# Patient Record
Sex: FEMALE | Race: WHITE | NOT HISPANIC OR LATINO | URBAN - METROPOLITAN AREA
[De-identification: names, ages, dates, MRNs, and addresses within clinical notes are randomized per-mention and may not be internally consistent; named-entity substitution may affect disease eponyms.]

---

## 2024-01-24 ENCOUNTER — OFFICE VISIT (OUTPATIENT)
Dept: INTERNAL MEDICINE | Facility: CLINIC | Age: 24
End: 2024-01-24
Payer: COMMERCIAL

## 2024-01-24 ENCOUNTER — LAB (OUTPATIENT)
Dept: INTERNAL MEDICINE | Facility: CLINIC | Age: 24
End: 2024-01-24
Payer: COMMERCIAL

## 2024-01-24 VITALS
SYSTOLIC BLOOD PRESSURE: 100 MMHG | HEART RATE: 72 BPM | TEMPERATURE: 97.1 F | RESPIRATION RATE: 16 BRPM | OXYGEN SATURATION: 98 % | HEIGHT: 61 IN | WEIGHT: 104 LBS | DIASTOLIC BLOOD PRESSURE: 72 MMHG | BODY MASS INDEX: 19.63 KG/M2

## 2024-01-24 DIAGNOSIS — Z11.4 SCREENING FOR HIV (HUMAN IMMUNODEFICIENCY VIRUS): ICD-10-CM

## 2024-01-24 DIAGNOSIS — Z00.00 PHYSICAL EXAM: Primary | ICD-10-CM

## 2024-01-24 DIAGNOSIS — E55.9 VITAMIN D DEFICIENCY: ICD-10-CM

## 2024-01-24 DIAGNOSIS — Z11.59 NEED FOR HEPATITIS C SCREENING TEST: ICD-10-CM

## 2024-01-24 DIAGNOSIS — Z01.84 IMMUNITY STATUS TESTING: Primary | ICD-10-CM

## 2024-01-24 DIAGNOSIS — Z00.00 ANNUAL PHYSICAL EXAM: ICD-10-CM

## 2024-01-24 DIAGNOSIS — Z11.51 SCREENING FOR HPV (HUMAN PAPILLOMAVIRUS): ICD-10-CM

## 2024-01-24 DIAGNOSIS — Z23 NEED FOR TETANUS BOOSTER: ICD-10-CM

## 2024-01-24 DIAGNOSIS — K52.9 CHRONIC DIARRHEA: ICD-10-CM

## 2024-01-24 DIAGNOSIS — J03.90 TONSILLITIS: ICD-10-CM

## 2024-01-24 LAB
BASOPHILS # BLD AUTO: 0.05 10*3/MM3 (ref 0–0.2)
BASOPHILS NFR BLD AUTO: 0.6 % (ref 0–1.5)
DEPRECATED RDW RBC AUTO: 40.4 FL (ref 37–54)
EOSINOPHIL # BLD AUTO: 0.07 10*3/MM3 (ref 0–0.4)
EOSINOPHIL NFR BLD AUTO: 0.9 % (ref 0.3–6.2)
ERYTHROCYTE [DISTWIDTH] IN BLOOD BY AUTOMATED COUNT: 12.4 % (ref 12.3–15.4)
ERYTHROCYTE [SEDIMENTATION RATE] IN BLOOD: 2 MM/HR (ref 0–20)
HCT VFR BLD AUTO: 39.6 % (ref 34–46.6)
HGB BLD-MCNC: 13.1 G/DL (ref 12–15.9)
IMM GRANULOCYTES # BLD AUTO: 0.01 10*3/MM3 (ref 0–0.05)
IMM GRANULOCYTES NFR BLD AUTO: 0.1 % (ref 0–0.5)
LYMPHOCYTES # BLD AUTO: 2.71 10*3/MM3 (ref 0.7–3.1)
LYMPHOCYTES NFR BLD AUTO: 33.7 % (ref 19.6–45.3)
MCH RBC QN AUTO: 29.6 PG (ref 26.6–33)
MCHC RBC AUTO-ENTMCNC: 33.1 G/DL (ref 31.5–35.7)
MCV RBC AUTO: 89.4 FL (ref 79–97)
MONOCYTES # BLD AUTO: 0.72 10*3/MM3 (ref 0.1–0.9)
MONOCYTES NFR BLD AUTO: 9 % (ref 5–12)
NEUTROPHILS NFR BLD AUTO: 4.47 10*3/MM3 (ref 1.7–7)
NEUTROPHILS NFR BLD AUTO: 55.7 % (ref 42.7–76)
NRBC BLD AUTO-RTO: 0 /100 WBC (ref 0–0.2)
PLATELET # BLD AUTO: 294 10*3/MM3 (ref 140–450)
PMV BLD AUTO: 10.8 FL (ref 6–12)
RBC # BLD AUTO: 4.43 10*6/MM3 (ref 3.77–5.28)
WBC NRBC COR # BLD AUTO: 8.03 10*3/MM3 (ref 3.4–10.8)

## 2024-01-24 PROCEDURE — 86317 IMMUNOASSAY INFECTIOUS AGENT: CPT | Performed by: INTERNAL MEDICINE

## 2024-01-24 PROCEDURE — 86735 MUMPS ANTIBODY: CPT | Performed by: INTERNAL MEDICINE

## 2024-01-24 PROCEDURE — 86671 FUNGUS NES ANTIBODY: CPT | Performed by: INTERNAL MEDICINE

## 2024-01-24 PROCEDURE — 81001 URINALYSIS AUTO W/SCOPE: CPT | Performed by: INTERNAL MEDICINE

## 2024-01-24 PROCEDURE — G0432 EIA HIV-1/HIV-2 SCREEN: HCPCS | Performed by: INTERNAL MEDICINE

## 2024-01-24 PROCEDURE — 82607 VITAMIN B-12: CPT | Performed by: INTERNAL MEDICINE

## 2024-01-24 PROCEDURE — 82306 VITAMIN D 25 HYDROXY: CPT | Performed by: INTERNAL MEDICINE

## 2024-01-24 PROCEDURE — 80053 COMPREHEN METABOLIC PANEL: CPT | Performed by: INTERNAL MEDICINE

## 2024-01-24 PROCEDURE — 85652 RBC SED RATE AUTOMATED: CPT | Performed by: INTERNAL MEDICINE

## 2024-01-24 PROCEDURE — 85025 COMPLETE CBC W/AUTO DIFF WBC: CPT | Performed by: INTERNAL MEDICINE

## 2024-01-24 PROCEDURE — 86803 HEPATITIS C AB TEST: CPT | Performed by: INTERNAL MEDICINE

## 2024-01-24 PROCEDURE — 86037 ANCA TITER EACH ANTIBODY: CPT | Performed by: INTERNAL MEDICINE

## 2024-01-24 PROCEDURE — 86658 ENTEROVIRUS ANTIBODY: CPT | Performed by: INTERNAL MEDICINE

## 2024-01-24 PROCEDURE — 86762 RUBELLA ANTIBODY: CPT | Performed by: INTERNAL MEDICINE

## 2024-01-24 PROCEDURE — 86615 BORDETELLA ANTIBODY: CPT | Performed by: INTERNAL MEDICINE

## 2024-01-24 PROCEDURE — 86140 C-REACTIVE PROTEIN: CPT | Performed by: INTERNAL MEDICINE

## 2024-01-24 PROCEDURE — 83036 HEMOGLOBIN GLYCOSYLATED A1C: CPT | Performed by: INTERNAL MEDICINE

## 2024-01-24 PROCEDURE — 84443 ASSAY THYROID STIM HORMONE: CPT | Performed by: INTERNAL MEDICINE

## 2024-01-24 PROCEDURE — 86765 RUBEOLA ANTIBODY: CPT | Performed by: INTERNAL MEDICINE

## 2024-01-24 PROCEDURE — 80061 LIPID PANEL: CPT | Performed by: INTERNAL MEDICINE

## 2024-01-24 RX ORDER — LOXAPINE SUCCINATE 10 MG/1
TABLET ORAL
COMMUNITY

## 2024-01-24 RX ORDER — LOPERAMIDE HYDROCHLORIDE 2 MG/1
2 TABLET ORAL 4 TIMES DAILY PRN
Qty: 60 TABLET | Refills: 0 | Status: SHIPPED | OUTPATIENT
Start: 2024-01-24

## 2024-01-24 RX ORDER — AMOXICILLIN 875 MG/1
875 TABLET, COATED ORAL EVERY 12 HOURS SCHEDULED
Qty: 10 TABLET | Refills: 0 | Status: SHIPPED | OUTPATIENT
Start: 2024-01-24 | End: 2024-01-29

## 2024-01-24 NOTE — PROGRESS NOTES
Female Physical Note      Date: 2024   Patient Name: Nano Hernandez  : 2000   MRN: [id number]     Chief Complaint:    Chief Complaint   Patient presents with    Diarrhea     Going on for 2 yrs    Establish Care     Needs titers for vaccines     Annual Exam       History of Present Illness: Nano Hernandez is a 23 y.o. female who is here today for their annual health maintenance and physical. She is having chronic issues with diarrhea for the last 2 years.  She has never been evaluated for this.  Patient is needing titers for her vaccines.  Lost 40lbs from 6232-7097. Havent been able to gain back.  Little hair loss.  No cold or heat intolerance.      Has had night sweats. Has to not wear clothes due to the sweating.     Family hx of colon ca. Great grandmother. Great aunt and a cousin.      Tonsils are sore and inflammed last several days    Subjective      Review of Systems     I have reviewed the following portions of the patient's history and these were updated and discussed with the patient as appropriate: past family history, past medical history, past social history, past surgical history, and problem list.     Medications:     Current Outpatient Medications:     loxapine (LOXITANE) 10 MG capsule, , Disp: , Rfl:     loperamide (Imodium A-D) 2 MG tablet, Take 1 tablet by mouth 4 (Four) Times a Day As Needed for Diarrhea., Disp: 60 tablet, Rfl: 0    Allergies:   No Known Allergies    Immunizations:  Immunization History   Administered Date(s) Administered    Hep A, 2 Dose 2018    Meningococcal MCV4P (Menactra) 2018    Trumenba(meningococcal B) 2018      Colorectal Screening:     Last Completed Colonoscopy       This patient has no relevant Health Maintenance data.         None prior  Pap:    Last Completed Pap Smear       This patient has no relevant Health Maintenance data.         IUD 5 years   Mammogram:    Last Completed Mammogram       This patient has no relevant Health  "Maintenance data.             CT for Smoker (Age 55-75, 30pk yr):    Bone Density/DEXA:   Hep C ( 0744-6588): get today  HIV: get today    Diet/Physical activity: avg/no regular routine    Sexual Health: not currently active  Last menstrual period: 2 weeks ago    Breast feeding:           PHQ-9 Depression Screening  PHQ-9 Total Score: 14            Intimate partner violence: (Screen on initial visit, pregnant women, women with injuries, older adult with injury or evidence of neglect):  Violence can be a problem in many people's lives, so I now ask every patient about trauma or abuse they may have experienced in a relationship.  Stress/Safety - Do you feel safe in your relationship? Yes  Afraid/Abused - Have you ever been in a relationship where you were threatened, hurt, or afraid? No  Friend/Family - If yes, are your friends aware you have been hurt?  Emergency Plan - Do you have a safe place to go and the resources you need in an emergency? Yes    Osteoporosis: NA      Objective     Physical Exam:  Vital Signs:   Vitals:    24 1314   BP: 100/72   BP Location: Left arm   Patient Position: Sitting   Cuff Size: Adult   Pulse: 72   Resp: 16   Temp: 97.1 °F (36.2 °C)   TempSrc: Tympanic   SpO2: 98%   Weight: 47.2 kg (104 lb)   Height: 154.9 cm (61\")       Physical Exam  Constitutional:       General: She is not in acute distress.     Appearance: Normal appearance. She is not ill-appearing.   HENT:      Right Ear: Tympanic membrane normal.      Left Ear: Tympanic membrane normal.      Nose: No congestion or rhinorrhea.      Mouth/Throat:      Mouth: Mucous membranes are moist.      Pharynx: No oropharyngeal exudate.   Eyes:      Extraocular Movements: Extraocular movements intact.      Conjunctiva/sclera: Conjunctivae normal.      Pupils: Pupils are equal, round, and reactive to light.   Cardiovascular:      Rate and Rhythm: Normal rate and regular rhythm.   Pulmonary:      Effort: Pulmonary effort is normal. " No respiratory distress.      Breath sounds: Normal breath sounds.   Abdominal:      General: Abdomen is flat. Bowel sounds are normal.      Palpations: Abdomen is soft.      Tenderness: There is no abdominal tenderness.   Musculoskeletal:         General: No swelling.      Cervical back: Neck supple.      Right lower leg: No edema.      Left lower leg: No edema.   Skin:     Coloration: Skin is not jaundiced.      Findings: No rash.   Neurological:      General: No focal deficit present.      Mental Status: She is alert and oriented to person, place, and time.      Cranial Nerves: No cranial nerve deficit.   Psychiatric:         Mood and Affect: Mood normal.         Behavior: Behavior normal.         Thought Content: Thought content normal.         Procedures    Assessment / Plan      Assessment/Plan:   Diagnoses and all orders for this visit:    1. Immunity status testing (Primary)  -     Hepatitis B Surf Antibody Quant  -     Diphtheria Antitoxoid Ab  -     B Pertussis IgG Ab  -     Tetanus Toxoid, IgG  -     Measles / Mumps / Rubella Immunity  -     Poliovirus (Types 1,3) Ab    2. Chronic diarrhea  -     C-reactive protein  -     Sedimentation Rate  -     Inflammatory Bowel Disease Panel  -     Ambulatory Referral to Gastroenterology  -     loperamide (Imodium A-D) 2 MG tablet; Take 1 tablet by mouth 4 (Four) Times a Day As Needed for Diarrhea.  Dispense: 60 tablet; Refill: 0    3. Screening for prostate cancer    4. Annual physical exam  -     Hemoglobin A1c  -     Vitamin B12  -     Lipid Panel  -     Comprehensive Metabolic Panel  -     CBC & Differential  -     Urinalysis With Microscopic - Urine, Clean Catch  -     TSH Rfx On Abnormal To Free T4    5. Vitamin D deficiency  -     Vitamin D,25-Hydroxy    6. Need for hepatitis C screening test  -     Hepatitis C Antibody    7. Screening for HIV (human immunodeficiency virus)  -     HIV-1 / O / 2 Ag / Antibody    8. Screening for HPV (human papillomavirus)  -      Ambulatory Referral to Gynecology    9. Need for tetanus booster  -     Tdap Vaccine => 8yo IM (BOOSTRIX)         BMI is within normal parameters. No other follow-up for BMI required.       Follow Up:   No follow-ups on file.    Healthcare Maintenance:   Counseling provided on exercise, nutrition, age-appropriate screening test, and appropriate lab studies.   Nanocary Hernandez voices understanding and acceptance of this advice and will call back with any further questions or concerns. AVS with preventive healthcare tips printed for patient.     Reviewed the following with the patient: encouraged patient to exercise 5-7 days per week for 30 minutes at a time.          Terry Vee DO  [facility]

## 2024-01-25 PROBLEM — E55.9 VITAMIN D DEFICIENCY: Status: ACTIVE | Noted: 2024-01-25

## 2024-01-25 LAB
25(OH)D3 SERPL-MCNC: 9 NG/ML (ref 30–100)
ALBUMIN SERPL-MCNC: 4.9 G/DL (ref 3.5–5.2)
ALBUMIN/GLOB SERPL: 2.3 G/DL
ALP SERPL-CCNC: 64 U/L (ref 39–117)
ALT SERPL W P-5'-P-CCNC: 8 U/L (ref 1–33)
ANION GAP SERPL CALCULATED.3IONS-SCNC: 10.2 MMOL/L (ref 5–15)
AST SERPL-CCNC: 18 U/L (ref 1–32)
BACTERIA UR QL AUTO: ABNORMAL /HPF
BILIRUB SERPL-MCNC: 0.4 MG/DL (ref 0–1.2)
BILIRUB UR QL STRIP: NEGATIVE
BUN SERPL-MCNC: 13 MG/DL (ref 6–20)
BUN/CREAT SERPL: 18.1 (ref 7–25)
CALCIUM SPEC-SCNC: 9.8 MG/DL (ref 8.6–10.5)
CHLORIDE SERPL-SCNC: 104 MMOL/L (ref 98–107)
CHOLEST SERPL-MCNC: 141 MG/DL (ref 0–200)
CLARITY UR: CLEAR
CO2 SERPL-SCNC: 26.8 MMOL/L (ref 22–29)
COLOR UR: YELLOW
CREAT SERPL-MCNC: 0.72 MG/DL (ref 0.57–1)
CRP SERPL-MCNC: <0.3 MG/DL (ref 0–0.5)
EGFRCR SERPLBLD CKD-EPI 2021: 122.2 ML/MIN/1.73
GLOBULIN UR ELPH-MCNC: 2.1 GM/DL
GLUCOSE SERPL-MCNC: 82 MG/DL (ref 65–99)
GLUCOSE UR STRIP-MCNC: NEGATIVE MG/DL
HBA1C MFR BLD: 5.5 % (ref 4.8–5.6)
HCV AB SER DONR QL: NORMAL
HDLC SERPL-MCNC: 66 MG/DL (ref 40–60)
HGB UR QL STRIP.AUTO: NEGATIVE
HIV 1+2 AB+HIV1 P24 AG SERPL QL IA: NORMAL
HYALINE CASTS UR QL AUTO: ABNORMAL /LPF
KETONES UR QL STRIP: NEGATIVE
LDLC SERPL CALC-MCNC: 66 MG/DL (ref 0–100)
LDLC/HDLC SERPL: 1.03 {RATIO}
LEUKOCYTE ESTERASE UR QL STRIP.AUTO: NEGATIVE
MUCOUS THREADS URNS QL MICRO: ABNORMAL /HPF
NITRITE UR QL STRIP: NEGATIVE
PH UR STRIP.AUTO: 7.5 [PH] (ref 5–8)
POTASSIUM SERPL-SCNC: 4.1 MMOL/L (ref 3.5–5.2)
PROT SERPL-MCNC: 7 G/DL (ref 6–8.5)
PROT UR QL STRIP: ABNORMAL
RBC # UR STRIP: ABNORMAL /HPF
REF LAB TEST METHOD: ABNORMAL
SODIUM SERPL-SCNC: 141 MMOL/L (ref 136–145)
SP GR UR STRIP: 1.03 (ref 1–1.03)
SQUAMOUS #/AREA URNS HPF: ABNORMAL /HPF
TRIGL SERPL-MCNC: 36 MG/DL (ref 0–150)
TSH SERPL DL<=0.05 MIU/L-ACNC: 0.39 UIU/ML (ref 0.27–4.2)
UROBILINOGEN UR QL STRIP: ABNORMAL
VIT B12 BLD-MCNC: 353 PG/ML (ref 211–946)
VLDLC SERPL-MCNC: 9 MG/DL (ref 5–40)
WBC # UR STRIP: ABNORMAL /HPF

## 2024-01-25 RX ORDER — CHOLECALCIFEROL (VITAMIN D3) 50 MCG
2000 TABLET ORAL DAILY
Qty: 30 TABLET | Refills: 5 | Status: SHIPPED | OUTPATIENT
Start: 2024-01-25

## 2024-01-26 ENCOUNTER — PATIENT ROUNDING (BHMG ONLY) (OUTPATIENT)
Dept: INTERNAL MEDICINE | Facility: CLINIC | Age: 24
End: 2024-01-26
Payer: COMMERCIAL

## 2024-01-26 LAB
HBV SURFACE AB SER-ACNC: <3.1 MIU/ML
MEV IGG SER IA-ACNC: 153 AU/ML
MUV IGG SER IA-ACNC: 52.7 AU/ML
RUBV IGG SERPL IA-ACNC: 3.28 INDEX

## 2024-01-26 NOTE — PROGRESS NOTES
A  my chart message has been sent to the patient for patient rounding with [facility].

## 2024-01-29 LAB
B PERT IGG SER IA-ACNC: 1.2 INDEX (ref 0–0.94)
BAKER'S YEAST IGA QN: <20 UNITS (ref 0–24.9)
BAKER'S YEAST IGG QN: <20 UNITS (ref 0–24.9)
P-ANCA ATYPICAL TITR SER IF: NORMAL TITER

## 2024-01-30 ENCOUNTER — TELEPHONE (OUTPATIENT)
Dept: INTERNAL MEDICINE | Facility: CLINIC | Age: 24
End: 2024-01-30
Payer: COMMERCIAL

## 2024-01-30 LAB
C DIPHTHERIAE AB SER IA-ACNC: 0.61 IU/ML
C TETANI TOXOID IGG SERPL IA-ACNC: 0.7 IU/ML

## 2024-01-30 NOTE — TELEPHONE ENCOUNTER
LVM for pt to return call.     OK FOR HUB TO RELAY MESSAGE----- Message from Rajesh Chester PA-C sent at 1/30/2024 11:09 AM EST -----  Pt immune to whooping cough.

## 2024-01-30 NOTE — TELEPHONE ENCOUNTER
Name: Amelia Hernandezgail    Relationship: Self    Best Callback Number: [phone]    HUB PROVIDED THE RELAY MESSAGE FROM THE OFFICE   PATIENT VOICED UNDERSTANDING AND HAS NO FURTHER QUESTIONS AT THIS TIME

## 2024-02-05 LAB
PV1 NAB TITR SER NT: NORMAL {TITER}
PV3 NAB TITR SER NT: NORMAL {TITER}

## 2024-07-29 ENCOUNTER — OFFICE VISIT (OUTPATIENT)
Dept: INTERNAL MEDICINE | Facility: CLINIC | Age: 24
End: 2024-07-29
Payer: COMMERCIAL

## 2024-07-29 VITALS
SYSTOLIC BLOOD PRESSURE: 112 MMHG | WEIGHT: 105 LBS | TEMPERATURE: 97.3 F | DIASTOLIC BLOOD PRESSURE: 70 MMHG | HEART RATE: 76 BPM | HEIGHT: 61 IN | OXYGEN SATURATION: 98 % | BODY MASS INDEX: 19.83 KG/M2

## 2024-07-29 DIAGNOSIS — K52.9 CHRONIC DIARRHEA: ICD-10-CM

## 2024-07-29 DIAGNOSIS — J03.90 TONSILLITIS: ICD-10-CM

## 2024-07-29 DIAGNOSIS — J02.9 SORE THROAT: Primary | ICD-10-CM

## 2024-07-29 LAB
EXPIRATION DATE: NORMAL
INTERNAL CONTROL: NORMAL
Lab: NORMAL
S PYO AG THROAT QL: NEGATIVE

## 2024-07-29 PROCEDURE — 99213 OFFICE O/P EST LOW 20 MIN: CPT | Performed by: INTERNAL MEDICINE

## 2024-07-29 PROCEDURE — 87880 STREP A ASSAY W/OPTIC: CPT | Performed by: INTERNAL MEDICINE

## 2024-07-29 RX ORDER — AMOXICILLIN AND CLAVULANATE POTASSIUM 875; 125 MG/1; MG/1
1 TABLET, FILM COATED ORAL 2 TIMES DAILY
Qty: 14 TABLET | Refills: 0 | Status: SHIPPED | OUTPATIENT
Start: 2024-07-29

## 2024-07-29 RX ORDER — PREDNISONE 5 MG/1
1 TABLET ORAL DAILY
Qty: 21 TABLET | Refills: 0 | Status: SHIPPED | OUTPATIENT
Start: 2024-07-29

## 2024-07-29 RX ORDER — TOPIRAMATE 25 MG/1
TABLET ORAL
COMMUNITY
Start: 2023-07-01

## 2024-07-29 NOTE — PROGRESS NOTES
Answers submitted by the patient for this visit:  Primary Reason for Visit (Submitted on 2024)  What is the primary reason for your visit?: Sore Throat  Sore Throat Questionnaire (Submitted on 2024)  Chief Complaint: Sore throat  Chronicity: recurrent  Onset: yesterday  Pain worse on: right  Pain - numeric: 6/10  congestion: Yes  cough: Yes  diarrhea: Yes  drainage: Yes  headaches: Yes  hoarse voice: Yes  swollen glands: Yes  Additional Information: swollen tonsils       Follow Up Office Visit      Date: 2024   Patient Name: Nano Hernandez  : 2000   MRN: [id number]     Chief Complaint:    Chief Complaint   Patient presents with    Sore Throat       History of Present Illness: Nano Hernandez is a 24 y.o. female who is here today to follow up with recurrent tonsillitis/pharyingitis/ sinusitis.  Tonsils inlfammed, enlarged. Having drainage.  Has recurrent tonsillitis at least 6 times per year. Has never seen ENT for this.       Subjective      Past Medical History:   Diagnosis Date    Depression        Past Surgical History:   Procedure Laterality Date    APPENDECTOMY  2016       Family History   Problem Relation Age of Onset    Drug abuse Mother     Psoriasis Father     Drug abuse Father         Social History     Socioeconomic History    Marital status: Single   Tobacco Use    Smoking status: Never    Smokeless tobacco: Never   Vaping Use    Vaping status: Former   Substance and Sexual Activity    Alcohol use: Yes    Drug use: Never    Sexual activity: Yes     Partners: Female, Male     Birth control/protection: Condom, I.U.D.         I have reviewed the patients family history, social history, past medical history, past surgical history and have updated it as appropriate.     Medications:     Current Outpatient Medications:     Cholecalciferol (Vitamin D) 50 MCG ( UT) tablet, Take 1 tablet by mouth Daily., Disp: 30 tablet, Rfl: 5    loxapine (LOXITANE) 10 MG capsule, , Disp: , Rfl:      "topiramate (TOPAMAX) 25 MG tablet, , Disp: , Rfl:     loperamide (Imodium A-D) 2 MG tablet, Take 1 tablet by mouth 4 (Four) Times a Day As Needed for Diarrhea. (Patient not taking: Reported on 7/29/2024), Disp: 60 tablet, Rfl: 0    Allergies:   No Known Allergies    Objective       Vital Signs:   Vitals:    07/29/24 1103   BP: 112/70   BP Location: Left arm   Patient Position: Sitting   Cuff Size: Adult   Pulse: 76   Temp: 97.3 °F (36.3 °C)   TempSrc: Tympanic   SpO2: 98%   Weight: 47.6 kg (105 lb)   Height: 154.9 cm (61\")     Body mass index is 19.84 kg/m².    Physical Exam:  Physical Exam  HENT:      Right Ear: Tympanic membrane normal.      Left Ear: Tympanic membrane normal.      Nose: Congestion present.      Mouth/Throat:      Mouth: Mucous membranes are moist.      Pharynx: Posterior oropharyngeal erythema present.      Comments: Tonsils enlarge, right > left.  Erythema.         Procedures    Results:     Assessment / Plan      Assessment/Plan:   Diagnoses and all orders for this visit:    1. Sore throat (Primary)  -     POCT rapid strep A    2. Tonsillitis     Start augmentin. Referral to ent for recurrent tonsillitis. Prednisone taper.     BMI is within normal parameters. No other follow-up for BMI required.       Follow Up:   No follow-ups on file.    Terry Vee DO    [facility]  "

## 2024-07-30 ENCOUNTER — TELEPHONE (OUTPATIENT)
Dept: INTERNAL MEDICINE | Facility: CLINIC | Age: 24
End: 2024-07-30

## 2024-07-30 NOTE — TELEPHONE ENCOUNTER
Caller: Nano Hernandez    Relationship: Self    Best call back number: [phone]     What form or medical record are you requesting: WORK NOTE    Who is requesting this form or medical record from you: PATIENT/EMPLOYER    How would you like to receive the form or medical records (pick-up, mail, fax): FAX    If fax, what is the fax number: [phone]  ATTN: YULISA    Timeframe paperwork needed: ASAP TODAY    PATIENT WAS SEEN IN OFFICE ON 7/29/24 AND FORGOT TO GET A NOTE

## 2024-11-15 ENCOUNTER — LAB (OUTPATIENT)
Dept: LAB | Facility: HOSPITAL | Age: 24
End: 2024-11-15
Payer: COMMERCIAL

## 2024-11-15 ENCOUNTER — OFFICE VISIT (OUTPATIENT)
Dept: GASTROENTEROLOGY | Facility: CLINIC | Age: 24
End: 2024-11-15
Payer: COMMERCIAL

## 2024-11-15 VITALS
SYSTOLIC BLOOD PRESSURE: 120 MMHG | BODY MASS INDEX: 20.2 KG/M2 | DIASTOLIC BLOOD PRESSURE: 64 MMHG | WEIGHT: 107 LBS | HEART RATE: 67 BPM | TEMPERATURE: 98.3 F | HEIGHT: 61 IN | OXYGEN SATURATION: 98 %

## 2024-11-15 DIAGNOSIS — R11.0 NAUSEA: ICD-10-CM

## 2024-11-15 DIAGNOSIS — R14.0 ABDOMINAL BLOATING: ICD-10-CM

## 2024-11-15 DIAGNOSIS — K58.0 IRRITABLE BOWEL SYNDROME WITH DIARRHEA: Primary | ICD-10-CM

## 2024-11-15 LAB
25(OH)D3 SERPL-MCNC: 8.2 NG/ML (ref 30–100)
ALBUMIN SERPL-MCNC: 4.8 G/DL (ref 3.5–5.2)
ALBUMIN/GLOB SERPL: 2 G/DL
ALP SERPL-CCNC: 66 U/L (ref 39–117)
ALT SERPL W P-5'-P-CCNC: 11 U/L (ref 1–33)
ANION GAP SERPL CALCULATED.3IONS-SCNC: 11 MMOL/L (ref 5–15)
AST SERPL-CCNC: 14 U/L (ref 1–32)
BILIRUB SERPL-MCNC: 0.4 MG/DL (ref 0–1.2)
BUN SERPL-MCNC: 10 MG/DL (ref 6–20)
BUN/CREAT SERPL: 14.5 (ref 7–25)
CALCIUM SPEC-SCNC: 9.7 MG/DL (ref 8.6–10.5)
CHLORIDE SERPL-SCNC: 105 MMOL/L (ref 98–107)
CO2 SERPL-SCNC: 26 MMOL/L (ref 22–29)
CREAT SERPL-MCNC: 0.69 MG/DL (ref 0.57–1)
EGFRCR SERPLBLD CKD-EPI 2021: 126 ML/MIN/1.73
GLOBULIN UR ELPH-MCNC: 2.4 GM/DL
GLUCOSE SERPL-MCNC: 82 MG/DL (ref 65–99)
MAGNESIUM SERPL-MCNC: 2.1 MG/DL (ref 1.6–2.6)
POTASSIUM SERPL-SCNC: 3.6 MMOL/L (ref 3.5–5.2)
PROT SERPL-MCNC: 7.2 G/DL (ref 6–8.5)
SODIUM SERPL-SCNC: 142 MMOL/L (ref 136–145)
TSH SERPL DL<=0.05 MIU/L-ACNC: 0.74 UIU/ML (ref 0.27–4.2)

## 2024-11-15 PROCEDURE — 80053 COMPREHEN METABOLIC PANEL: CPT | Performed by: PHYSICIAN ASSISTANT

## 2024-11-15 PROCEDURE — 82306 VITAMIN D 25 HYDROXY: CPT | Performed by: PHYSICIAN ASSISTANT

## 2024-11-15 PROCEDURE — 83735 ASSAY OF MAGNESIUM: CPT | Performed by: PHYSICIAN ASSISTANT

## 2024-11-15 PROCEDURE — 84443 ASSAY THYROID STIM HORMONE: CPT | Performed by: PHYSICIAN ASSISTANT

## 2024-11-15 PROCEDURE — 36415 COLL VENOUS BLD VENIPUNCTURE: CPT | Performed by: PHYSICIAN ASSISTANT

## 2024-11-15 RX ORDER — LEVOFLOXACIN 500 MG/1
1 TABLET, FILM COATED ORAL DAILY
COMMUNITY
Start: 2024-08-21 | End: 2024-11-15

## 2024-11-15 RX ORDER — DOXYCYCLINE HYCLATE 100 MG
1 TABLET ORAL EVERY 12 HOURS SCHEDULED
COMMUNITY
Start: 2024-10-18 | End: 2024-11-15

## 2024-11-15 RX ORDER — SODIUM PICOSULFATE, MAGNESIUM OXIDE, AND ANHYDROUS CITRIC ACID 12; 3.5; 1 G/175ML; G/175ML; MG/175ML
350 LIQUID ORAL TAKE AS DIRECTED
Qty: 350 ML | Refills: 0 | Status: SHIPPED | OUTPATIENT
Start: 2024-11-15 | End: 2024-11-16

## 2024-11-15 NOTE — H&P (VIEW-ONLY)
New Patient Consultation     Patient Name: Nano Hernandez  : 2000   MRN: [id number]     Chief Complaint:    Chief Complaint   Patient presents with    Diarrhea    Abdominal Pain       History of Present Illness: Nano Hernandez is a 24 y.o. female who is here today for a Gastroenterology Consultation for diarrhea, abdominal pain.     Pt notes ongoing diarrhea, abdominal bloating, generalized abdominal cramping for a few years now. She describes significant bloating, worse overnight and in the morning, followed by 4-5 loose, urgent stools throughout the day. Sx are improved with evacuation. She notes occasional blood in stool, foamy stools and nocturnal waking to evacuate. She denies improvement with elimination diets. She does identify sx are worse with anxiety.     Patient denies associated fever, chills, indigestion, vomiting, constipation, hematemesis, dysphagia, hematochezia, melena, weight loss or gain, dysuria, jaundice or bruising.    Patient denies personal or FHx of PUD, H Pylori, gastritis, pancreatitis, colitis, Celiac disease, UC, colon or gastric cancers. Maternal greatgrandmother with colon cancer. Paternal uncle with Crohn's disease. Pt denies tobacco, illicit substance or NSAID use. S/p appe.     CT A/P w/ contrast 2024:  No acute findings in the abdomen or pelvis.     No previous EGD / CSY.     Subjective      Review of Systems:   Review of Systems   Constitutional:  Negative for appetite change, chills, diaphoresis, fatigue, fever, unexpected weight gain and unexpected weight loss.   HENT:  Negative for drooling, facial swelling, mouth sores, rhinorrhea, sore throat, tinnitus, trouble swallowing and voice change.    Eyes: Negative.    Respiratory:  Negative for cough, chest tightness and shortness of breath.    Cardiovascular:  Negative for chest pain.   Gastrointestinal:  Positive for abdominal distention, abdominal pain, diarrhea and nausea. Negative for blood in stool, constipation,  vomiting, GERD and indigestion.   Genitourinary:  Negative for dysuria, flank pain, hematuria and pelvic pain.   Musculoskeletal:  Negative for arthralgias and myalgias.   Skin:  Negative for color change, pallor and rash.   Neurological:  Negative for dizziness, tremors, syncope, weakness and numbness.   Psychiatric/Behavioral:  Negative for hallucinations and sleep disturbance.    All other systems reviewed and are negative.      Past Medical History:   Past Medical History:   Diagnosis Date    Depression 2014       Past Surgical History:   Past Surgical History:   Procedure Laterality Date    APPENDECTOMY  2016       Family History:   Family History   Problem Relation Age of Onset    Drug abuse Mother     Psoriasis Father     Drug abuse Father     Colon cancer Maternal Grandmother     Esophageal cancer Neg Hx        Social History:   Social History     Socioeconomic History    Marital status: Single   Tobacco Use    Smoking status: Never    Smokeless tobacco: Never   Vaping Use    Vaping status: Former   Substance and Sexual Activity    Alcohol use: Yes    Drug use: Never    Sexual activity: Yes     Partners: Female, Male     Birth control/protection: Condom, I.U.D.       Alcohol/Tobacco History:   Social History     Substance and Sexual Activity   Alcohol Use Yes     Social History     Tobacco Use   Smoking Status Never   Smokeless Tobacco Never       Medications:     Current Outpatient Medications:     Cholecalciferol (Vitamin D) 50 MCG (2000 UT) tablet, Take 1 tablet by mouth Daily., Disp: 30 tablet, Rfl: 5    loxapine (LOXITANE) 10 MG capsule, , Disp: , Rfl:     topiramate (TOPAMAX) 25 MG tablet, , Disp: , Rfl:     Allergies:   No Known Allergies    Objective     Physical Exam:  Vital Signs:   Vitals:    11/15/24 0915   BP: 120/64   BP Location: Right arm   Patient Position: Sitting   Cuff Size: Adult   Pulse: 67   Temp: 98.3 °F (36.8 °C)   TempSrc: Temporal   SpO2: 98%   Weight: 48.5 kg (107 lb)   Height:  "154.9 cm (61\")     Body mass index is 20.22 kg/m².     Physical Exam  Vitals and nursing note reviewed.   Constitutional:       Appearance: Normal appearance. She is normal weight. She is not ill-appearing or diaphoretic.   HENT:      Head: Normocephalic and atraumatic.      Right Ear: External ear normal.      Left Ear: External ear normal.      Nose: Nose normal.      Mouth/Throat:      Mouth: Mucous membranes are moist.      Pharynx: Oropharynx is clear.   Eyes:      Conjunctiva/sclera: Conjunctivae normal.      Pupils: Pupils are equal, round, and reactive to light.   Neck:      Thyroid: No thyromegaly.   Cardiovascular:      Rate and Rhythm: Normal rate and regular rhythm.      Pulses: Normal pulses.      Heart sounds: Normal heart sounds.   Pulmonary:      Effort: Pulmonary effort is normal.      Breath sounds: Normal breath sounds.   Abdominal:      General: Abdomen is flat. Bowel sounds are normal. There is no distension.      Tenderness: There is abdominal tenderness (mild, generalized).   Musculoskeletal:         General: Normal range of motion.      Cervical back: Normal range of motion and neck supple.   Skin:     General: Skin is warm and dry.   Neurological:      General: No focal deficit present.      Mental Status: She is oriented to person, place, and time.   Psychiatric:         Mood and Affect: Mood normal.         Assessment / Plan      Assessment/Plan:   There are no diagnoses linked to this encounter.     IBS-D  Bloating  Nausea   - rx for Xifaxan 550mg TID x 14 days sent to pharmacy    - previous office notes, hospital records, labs, imaging, endoscopy and pathology reports reviewed with patient    - obtain CBC, CMP, CRP, celiac panel, Mg, TSH, vit D, stool panel, C difficile, ova + parasites   - schedule for CSY +/- EGD, pending labs above   - follow up in clinic after completion of above studies   - call clinic at any time for questions or new / worsened sx    Follow Up:   Return for Next " scheduled follow up.    Plan of care reviewed with the patient at the conclusion of today's visit.  Education was provided regarding diagnosis, management, and any prescribed or recommended OTC medications.  Patient verbalized understanding of and agreement with management plan.     NOTE TO PATIENT: The 21st Century Cures Act makes medical notes like these available to patients in the interest of transparency. However, be advised this is a medical document. It is intended as peer to peer communication. It is written in medical language and may contain abbreviations or verbiage that are unfamiliar. It may appear blunt or direct. Medical documents are intended to carry relevant information, facts as evident, and the clinical opinion of the practitioner.     Time Statement:   Discussed plan of care in detail with patient today. Patient verbally understands and agrees. I have spent 45 minutes reviewing available diagnostics, obtaining history, examining the patient, developing a treatment plan, and educating the patient on disease process and plan of care.    Angelica Crawford PA-C   [facility]

## 2024-11-15 NOTE — PROGRESS NOTES
New Patient Consultation     Patient Name: Nano Hernandez  : 2000   MRN: [id number]     Chief Complaint:    Chief Complaint   Patient presents with    Diarrhea    Abdominal Pain       History of Present Illness: Nano Hernandez is a 24 y.o. female who is here today for a Gastroenterology Consultation for diarrhea, abdominal pain.     Pt notes ongoing diarrhea, abdominal bloating, generalized abdominal cramping for a few years now. She describes significant bloating, worse overnight and in the morning, followed by 4-5 loose, urgent stools throughout the day. Sx are improved with evacuation. She notes occasional blood in stool, foamy stools and nocturnal waking to evacuate. She denies improvement with elimination diets. She does identify sx are worse with anxiety.     Patient denies associated fever, chills, indigestion, vomiting, constipation, hematemesis, dysphagia, hematochezia, melena, weight loss or gain, dysuria, jaundice or bruising.    Patient denies personal or FHx of PUD, H Pylori, gastritis, pancreatitis, colitis, Celiac disease, UC, colon or gastric cancers. Maternal greatgrandmother with colon cancer. Paternal uncle with Crohn's disease. Pt denies tobacco, illicit substance or NSAID use. S/p appe.     CT A/P w/ contrast 2024:  No acute findings in the abdomen or pelvis.     No previous EGD / CSY.     Subjective      Review of Systems:   Review of Systems   Constitutional:  Negative for appetite change, chills, diaphoresis, fatigue, fever, unexpected weight gain and unexpected weight loss.   HENT:  Negative for drooling, facial swelling, mouth sores, rhinorrhea, sore throat, tinnitus, trouble swallowing and voice change.    Eyes: Negative.    Respiratory:  Negative for cough, chest tightness and shortness of breath.    Cardiovascular:  Negative for chest pain.   Gastrointestinal:  Positive for abdominal distention, abdominal pain, diarrhea and nausea. Negative for blood in stool, constipation,  vomiting, GERD and indigestion.   Genitourinary:  Negative for dysuria, flank pain, hematuria and pelvic pain.   Musculoskeletal:  Negative for arthralgias and myalgias.   Skin:  Negative for color change, pallor and rash.   Neurological:  Negative for dizziness, tremors, syncope, weakness and numbness.   Psychiatric/Behavioral:  Negative for hallucinations and sleep disturbance.    All other systems reviewed and are negative.      Past Medical History:   Past Medical History:   Diagnosis Date    Depression 2014       Past Surgical History:   Past Surgical History:   Procedure Laterality Date    APPENDECTOMY  2016       Family History:   Family History   Problem Relation Age of Onset    Drug abuse Mother     Psoriasis Father     Drug abuse Father     Colon cancer Maternal Grandmother     Esophageal cancer Neg Hx        Social History:   Social History     Socioeconomic History    Marital status: Single   Tobacco Use    Smoking status: Never    Smokeless tobacco: Never   Vaping Use    Vaping status: Former   Substance and Sexual Activity    Alcohol use: Yes    Drug use: Never    Sexual activity: Yes     Partners: Female, Male     Birth control/protection: Condom, I.U.D.       Alcohol/Tobacco History:   Social History     Substance and Sexual Activity   Alcohol Use Yes     Social History     Tobacco Use   Smoking Status Never   Smokeless Tobacco Never       Medications:     Current Outpatient Medications:     Cholecalciferol (Vitamin D) 50 MCG (2000 UT) tablet, Take 1 tablet by mouth Daily., Disp: 30 tablet, Rfl: 5    loxapine (LOXITANE) 10 MG capsule, , Disp: , Rfl:     topiramate (TOPAMAX) 25 MG tablet, , Disp: , Rfl:     Allergies:   No Known Allergies    Objective     Physical Exam:  Vital Signs:   Vitals:    11/15/24 0915   BP: 120/64   BP Location: Right arm   Patient Position: Sitting   Cuff Size: Adult   Pulse: 67   Temp: 98.3 °F (36.8 °C)   TempSrc: Temporal   SpO2: 98%   Weight: 48.5 kg (107 lb)   Height:  "154.9 cm (61\")     Body mass index is 20.22 kg/m².     Physical Exam  Vitals and nursing note reviewed.   Constitutional:       Appearance: Normal appearance. She is normal weight. She is not ill-appearing or diaphoretic.   HENT:      Head: Normocephalic and atraumatic.      Right Ear: External ear normal.      Left Ear: External ear normal.      Nose: Nose normal.      Mouth/Throat:      Mouth: Mucous membranes are moist.      Pharynx: Oropharynx is clear.   Eyes:      Conjunctiva/sclera: Conjunctivae normal.      Pupils: Pupils are equal, round, and reactive to light.   Neck:      Thyroid: No thyromegaly.   Cardiovascular:      Rate and Rhythm: Normal rate and regular rhythm.      Pulses: Normal pulses.      Heart sounds: Normal heart sounds.   Pulmonary:      Effort: Pulmonary effort is normal.      Breath sounds: Normal breath sounds.   Abdominal:      General: Abdomen is flat. Bowel sounds are normal. There is no distension.      Tenderness: There is abdominal tenderness (mild, generalized).   Musculoskeletal:         General: Normal range of motion.      Cervical back: Normal range of motion and neck supple.   Skin:     General: Skin is warm and dry.   Neurological:      General: No focal deficit present.      Mental Status: She is oriented to person, place, and time.   Psychiatric:         Mood and Affect: Mood normal.         Assessment / Plan      Assessment/Plan:   There are no diagnoses linked to this encounter.     IBS-D  Bloating  Nausea   - rx for Xifaxan 550mg TID x 14 days sent to pharmacy    - previous office notes, hospital records, labs, imaging, endoscopy and pathology reports reviewed with patient    - obtain CBC, CMP, CRP, celiac panel, Mg, TSH, vit D, stool panel, C difficile, ova + parasites   - schedule for CSY +/- EGD, pending labs above   - follow up in clinic after completion of above studies   - call clinic at any time for questions or new / worsened sx    Follow Up:   Return for Next " scheduled follow up.    Plan of care reviewed with the patient at the conclusion of today's visit.  Education was provided regarding diagnosis, management, and any prescribed or recommended OTC medications.  Patient verbalized understanding of and agreement with management plan.     NOTE TO PATIENT: The 21st Century Cures Act makes medical notes like these available to patients in the interest of transparency. However, be advised this is a medical document. It is intended as peer to peer communication. It is written in medical language and may contain abbreviations or verbiage that are unfamiliar. It may appear blunt or direct. Medical documents are intended to carry relevant information, facts as evident, and the clinical opinion of the practitioner.     Time Statement:   Discussed plan of care in detail with patient today. Patient verbally understands and agrees. I have spent 45 minutes reviewing available diagnostics, obtaining history, examining the patient, developing a treatment plan, and educating the patient on disease process and plan of care.    Angelica Crawford PA-C   [facility]

## 2024-11-18 ENCOUNTER — PRIOR AUTHORIZATION (OUTPATIENT)
Dept: GASTROENTEROLOGY | Facility: CLINIC | Age: 24
End: 2024-11-18
Payer: COMMERCIAL

## 2024-11-18 ENCOUNTER — PREP FOR SURGERY (OUTPATIENT)
Dept: OTHER | Facility: HOSPITAL | Age: 24
End: 2024-11-18
Payer: COMMERCIAL

## 2024-11-18 DIAGNOSIS — R14.0 ABDOMINAL BLOATING: ICD-10-CM

## 2024-11-18 DIAGNOSIS — K58.0 IRRITABLE BOWEL SYNDROME WITH DIARRHEA: Primary | ICD-10-CM

## 2024-11-22 ENCOUNTER — ANESTHESIA EVENT (OUTPATIENT)
Dept: GASTROENTEROLOGY | Facility: HOSPITAL | Age: 24
End: 2024-11-22
Payer: COMMERCIAL

## 2024-11-22 ENCOUNTER — HOSPITAL ENCOUNTER (OUTPATIENT)
Facility: HOSPITAL | Age: 24
Setting detail: HOSPITAL OUTPATIENT SURGERY
Discharge: HOME | End: 2024-11-22
Attending: INTERNAL MEDICINE | Admitting: INTERNAL MEDICINE
Payer: COMMERCIAL

## 2024-11-22 ENCOUNTER — ANESTHESIA (OUTPATIENT)
Dept: GASTROENTEROLOGY | Facility: HOSPITAL | Age: 24
End: 2024-11-22
Payer: COMMERCIAL

## 2024-11-22 VITALS
OXYGEN SATURATION: 100 % | RESPIRATION RATE: 18 BRPM | SYSTOLIC BLOOD PRESSURE: 115 MMHG | DIASTOLIC BLOOD PRESSURE: 78 MMHG | TEMPERATURE: 97 F | HEART RATE: 59 BPM

## 2024-11-22 DIAGNOSIS — K58.0 IRRITABLE BOWEL SYNDROME WITH DIARRHEA: ICD-10-CM

## 2024-11-22 DIAGNOSIS — R14.0 ABDOMINAL BLOATING: ICD-10-CM

## 2024-11-22 LAB
B-HCG UR QL: NEGATIVE
EXPIRATION DATE: NORMAL
INTERNAL NEGATIVE CONTROL: NORMAL
INTERNAL POSITIVE CONTROL: NORMAL
Lab: NORMAL

## 2024-11-22 PROCEDURE — 81025 URINE PREGNANCY TEST: CPT | Performed by: ANESTHESIOLOGY

## 2024-11-22 PROCEDURE — 45380 COLONOSCOPY AND BIOPSY: CPT | Performed by: INTERNAL MEDICINE

## 2024-11-22 PROCEDURE — 25010000002 LIDOCAINE PF 1% 1 % SOLUTION: Performed by: NURSE ANESTHETIST, CERTIFIED REGISTERED

## 2024-11-22 PROCEDURE — 25010000002 PROPOFOL 10 MG/ML EMULSION: Performed by: NURSE ANESTHETIST, CERTIFIED REGISTERED

## 2024-11-22 PROCEDURE — 88305 TISSUE EXAM BY PATHOLOGIST: CPT | Performed by: INTERNAL MEDICINE

## 2024-11-22 RX ORDER — IPRATROPIUM BROMIDE AND ALBUTEROL SULFATE 2.5; .5 MG/3ML; MG/3ML
3 SOLUTION RESPIRATORY (INHALATION) ONCE AS NEEDED
Status: DISCONTINUED | OUTPATIENT
Start: 2024-11-22 | End: 2024-11-22 | Stop reason: HOSPADM

## 2024-11-22 RX ORDER — ONDANSETRON 2 MG/ML
4 INJECTION INTRAMUSCULAR; INTRAVENOUS ONCE AS NEEDED
Status: DISCONTINUED | OUTPATIENT
Start: 2024-11-22 | End: 2024-11-22 | Stop reason: HOSPADM

## 2024-11-22 RX ORDER — PROPOFOL 10 MG/ML
VIAL (ML) INTRAVENOUS AS NEEDED
Status: DISCONTINUED | OUTPATIENT
Start: 2024-11-22 | End: 2024-11-22 | Stop reason: SURG

## 2024-11-22 RX ORDER — LIDOCAINE HYDROCHLORIDE 10 MG/ML
INJECTION, SOLUTION EPIDURAL; INFILTRATION; INTRACAUDAL; PERINEURAL AS NEEDED
Status: DISCONTINUED | OUTPATIENT
Start: 2024-11-22 | End: 2024-11-22 | Stop reason: SURG

## 2024-11-22 RX ADMIN — PROPOFOL INJECTABLE EMULSION 100 MG: 10 INJECTION, EMULSION INTRAVENOUS at 14:10

## 2024-11-22 RX ADMIN — PROPOFOL INJECTABLE EMULSION 100 MG: 10 INJECTION, EMULSION INTRAVENOUS at 14:14

## 2024-11-22 RX ADMIN — PROPOFOL INJECTABLE EMULSION 100 MG: 10 INJECTION, EMULSION INTRAVENOUS at 13:53

## 2024-11-22 RX ADMIN — LIDOCAINE HYDROCHLORIDE 50 MG: 10 INJECTION, SOLUTION EPIDURAL; INFILTRATION; INTRACAUDAL; PERINEURAL at 13:49

## 2024-11-22 RX ADMIN — PROPOFOL INJECTABLE EMULSION 100 MG: 10 INJECTION, EMULSION INTRAVENOUS at 14:05

## 2024-11-22 NOTE — ANESTHESIA POSTPROCEDURE EVALUATION
Patient: Nano Hernandez    Procedure Summary       Date: 11/22/24 Room / Location:  [facility]    Anesthesia Start: 1347 Anesthesia Stop: 1425    Procedure: COLONOSCOPY Diagnosis:       Irritable bowel syndrome with diarrhea      Abdominal bloating      (Irritable bowel syndrome with diarrhea [K58.0])      (Abdominal bloating [R14.0])    Surgeons: Sivakumar Hooper MD Provider: Fly Husain MD    Anesthesia Type: general, MAC ASA Status: 2            Anesthesia Type: general, MAC    Vitals  Vitals Value Taken Time   BP     Temp     Pulse 65 11/22/24 1424   Resp     SpO2 99 % 11/22/24 1424   Vitals shown include unfiled device data.        Post Anesthesia Care and Evaluation    Patient location during evaluation: PACU  Patient participation: complete - patient participated  Level of consciousness: sleepy but conscious  Pain management: adequate    Airway patency: patent  Anesthetic complications: No anesthetic complications  PONV Status: none  Cardiovascular status: hemodynamically stable and acceptable  Respiratory status: nonlabored ventilation, acceptable and nasal cannula  Hydration status: acceptable

## 2024-11-22 NOTE — ANESTHESIA PREPROCEDURE EVALUATION
Anesthesia Evaluation     Patient summary reviewed and Nursing notes reviewed   NPO Solid Status: > 8 hours  NPO Liquid Status: > 2 hours           Airway   Mallampati: I  TM distance: >3 FB  Neck ROM: full  No difficulty expected  Dental      Pulmonary    (+) a smoker Current, vape,  (-) shortness of breath, recent URI, sleep apnea  Cardiovascular     (-) hypertension, past MI, dysrhythmias, angina      Neuro/Psych  (-) seizures, CVA  GI/Hepatic/Renal/Endo    (-) no renal disease, diabetes, no thyroid disorder    Musculoskeletal     Abdominal    Substance History      OB/GYN          Other        ROS/Med Hx Other: Suspected IBS               Anesthesia Plan    ASA 2     general and MAC     (PFL )  intravenous induction     Anesthetic plan, risks, benefits, and alternatives have been provided, discussed and informed consent has been obtained with: patient.    Plan discussed with CRNA.    CODE STATUS:

## 2024-11-22 NOTE — INTERVAL H&P NOTE
H&P reviewed. The patient was examined and there are no changes to the H&P.      To colonoscopy  The risk of endoscopy was discussed including the risk of anesthesia, bowel perforation, bleeding, missed lesion, infection, and death should a severe complication occur.  The patient determined that the diagnostic benefit outweighed the risk.

## 2024-11-26 LAB
CYTO UR: NORMAL
LAB AP CASE REPORT: NORMAL
LAB AP CLINICAL INFORMATION: NORMAL
PATH REPORT.FINAL DX SPEC: NORMAL
PATH REPORT.GROSS SPEC: NORMAL

## (undated) DEVICE — SOLIDIFIER LIQ PREMISORB 1500CC

## (undated) DEVICE — AIR/WATER CLEANING VALVES: Brand: AIR/WATER CLEANING VALVES

## (undated) DEVICE — KT ORCA ORCAPOD DISP STRL

## (undated) DEVICE — SYR LUERLOK 50ML

## (undated) DEVICE — LUBE GEL ENDOGLIDE 1.1OZ

## (undated) DEVICE — SOL IRR H2O BO 1000ML STRL

## (undated) DEVICE — TUBING, SUCTION, 1/4" X 10', STRAIGHT: Brand: MEDLINE

## (undated) DEVICE — CONTN GRAD MEAS TRIANG 32OZ BLK

## (undated) DEVICE — INTRO ACCSR BLNT TP

## (undated) DEVICE — HYBRID CO2 TUBING/CAP SET FOR OLYMPUS® SCOPES & CO2 SOURCE: Brand: ERBE

## (undated) DEVICE — LUBE JELLY FOIL PACK 1.4 OZ: Brand: MEDLINE INDUSTRIES, INC.

## (undated) DEVICE — FIRST STEP BEDSIDE ADD WATER KIT - RESEALABLE STAND-UP POUCH, ENDOSCOPIC CLEANING PAD - 1 POUCH: Brand: FIRST STEP BEDSIDE ADD WATER KIT - RESEALABLE STAND-UP POUCH, ENDOSCOPIC CLEANIN

## (undated) DEVICE — SAFELINER SUCTION CANISTER 1000CC: Brand: DEROYAL